# Patient Record
Sex: FEMALE | URBAN - METROPOLITAN AREA
[De-identification: names, ages, dates, MRNs, and addresses within clinical notes are randomized per-mention and may not be internally consistent; named-entity substitution may affect disease eponyms.]

---

## 2021-02-17 ENCOUNTER — IMPORTED ENCOUNTER (OUTPATIENT)
Dept: URBAN - METROPOLITAN AREA CLINIC 9 | Facility: CLINIC | Age: 46
End: 2021-02-17

## 2021-02-17 PROBLEM — H04.123: Noted: 2021-02-17

## 2021-02-17 PROBLEM — H52.4: Noted: 2021-02-17

## 2021-08-30 NOTE — PATIENT DISCUSSION
change to amoxicillin 875 MG BID PO and drained in office today with major mucus production. Disc more than likely will need to see KK for excision if chalazion develops. Call with any fever or worsening.

## 2021-10-16 ASSESSMENT — KERATOMETRY
OS_K2POWER_DIOPTERS: 44.625
OD_K1POWER_DIOPTERS: 42.875
OD_AXISANGLE_DEGREES: 177
OD_AXISANGLE2_DEGREES: 87
OS_AXISANGLE2_DEGREES: 90
OS_K1POWER_DIOPTERS: 43.5
OD_K2POWER_DIOPTERS: 43.875
OS_AXISANGLE_DEGREES: 180

## 2021-10-16 ASSESSMENT — VISUAL ACUITY
OD_CC: 20/20 SN
OD_SC: 20/20 SN
OS_SC: 20/20 SN
OS_CC: 20/20 SN

## 2021-10-16 ASSESSMENT — TONOMETRY
OD_IOP_MMHG: 20
OS_IOP_MMHG: 22

## 2022-07-01 RX ORDER — FLUTICASONE PROPIONATE 50 MCG
SPRAY, SUSPENSION (ML) NASAL
COMMUNITY

## 2022-07-01 RX ORDER — CIPROFLOXACIN AND DEXAMETHASONE 3; 1 MG/ML; MG/ML
SUSPENSION/ DROPS AURICULAR (OTIC)
COMMUNITY
Start: 2021-06-26 | End: 2022-09-13 | Stop reason: ALTCHOICE

## 2022-08-31 ENCOUNTER — ESTABLISHED PATIENT (OUTPATIENT)
Dept: URBAN - METROPOLITAN AREA CLINIC 4 | Facility: CLINIC | Age: 47
End: 2022-08-31

## 2022-08-31 DIAGNOSIS — H04.123: ICD-10-CM

## 2022-08-31 DIAGNOSIS — H52.4: ICD-10-CM

## 2022-08-31 PROCEDURE — 92015 DETERMINE REFRACTIVE STATE: CPT

## 2022-08-31 PROCEDURE — 99214 OFFICE O/P EST MOD 30 MIN: CPT

## 2022-08-31 ASSESSMENT — VISUAL ACUITY
OS_SC: 20/20-1
OD_SC: 20/20

## 2022-08-31 ASSESSMENT — TONOMETRY
OD_IOP_MMHG: 17
OS_IOP_MMHG: 17

## 2022-08-31 ASSESSMENT — KERATOMETRY
OD_AXISANGLE2_DEGREES: 87
OS_K1POWER_DIOPTERS: 43.5
OD_AXISANGLE_DEGREES: 177
OS_K2POWER_DIOPTERS: 44.625
OD_K1POWER_DIOPTERS: 42.875
OD_K2POWER_DIOPTERS: 43.875
OS_AXISANGLE2_DEGREES: 90
OS_AXISANGLE_DEGREES: 180

## 2022-09-11 PROBLEM — E66.09 OTHER OBESITY DUE TO EXCESS CALORIES: Status: ACTIVE | Noted: 2022-09-11

## 2022-09-11 PROBLEM — J30.2 SEASONAL ALLERGIES: Status: ACTIVE | Noted: 2022-09-11

## 2022-09-11 PROBLEM — E66.9 OBESITY WITH BODY MASS INDEX 30 OR GREATER: Status: ACTIVE | Noted: 2022-09-11

## 2022-09-11 PROBLEM — F41.8 DEPRESSION WITH ANXIETY: Status: ACTIVE | Noted: 2022-09-11

## 2022-09-11 PROBLEM — I10 ESSENTIAL HYPERTENSION: Status: ACTIVE | Noted: 2022-09-11

## 2023-09-14 ENCOUNTER — ESTABLISHED PATIENT (OUTPATIENT)
Dept: URBAN - METROPOLITAN AREA CLINIC 4 | Facility: CLINIC | Age: 48
End: 2023-09-14

## 2023-09-14 DIAGNOSIS — H04.123: ICD-10-CM

## 2023-09-14 DIAGNOSIS — H52.4: ICD-10-CM

## 2023-09-14 PROCEDURE — 92014 COMPRE OPH EXAM EST PT 1/>: CPT

## 2023-09-14 ASSESSMENT — KERATOMETRY
OD_K1POWER_DIOPTERS: 42.875
OD_AXISANGLE_DEGREES: 177
OS_K2POWER_DIOPTERS: 44.625
OS_K1POWER_DIOPTERS: 43.5
OD_AXISANGLE2_DEGREES: 87
OD_K2POWER_DIOPTERS: 43.875
OS_AXISANGLE_DEGREES: 180
OS_AXISANGLE2_DEGREES: 90

## 2023-09-14 ASSESSMENT — VISUAL ACUITY
OS_SC: 20/20
OD_CC: J1+
OS_CC: J1+
OD_SC: 20/20

## 2023-09-14 ASSESSMENT — TONOMETRY
OD_IOP_MMHG: 16
OS_IOP_MMHG: 15

## 2025-03-06 ENCOUNTER — COMPREHENSIVE EXAM (OUTPATIENT)
Age: 50
End: 2025-03-06

## 2025-03-06 DIAGNOSIS — H52.4: ICD-10-CM

## 2025-03-06 DIAGNOSIS — H04.123: ICD-10-CM

## 2025-03-06 PROCEDURE — 92014 COMPRE OPH EXAM EST PT 1/>: CPT
